# Patient Record
Sex: MALE | Race: WHITE
[De-identification: names, ages, dates, MRNs, and addresses within clinical notes are randomized per-mention and may not be internally consistent; named-entity substitution may affect disease eponyms.]

---

## 2019-08-15 ENCOUNTER — HOSPITAL ENCOUNTER (EMERGENCY)
Dept: HOSPITAL 92 - SCSER | Age: 84
Discharge: HOME | End: 2019-08-15
Payer: MEDICARE

## 2019-08-15 DIAGNOSIS — F32.9: ICD-10-CM

## 2019-08-15 DIAGNOSIS — I49.9: ICD-10-CM

## 2019-08-15 DIAGNOSIS — I48.91: ICD-10-CM

## 2019-08-15 DIAGNOSIS — M25.551: Primary | ICD-10-CM

## 2019-08-15 DIAGNOSIS — K21.9: ICD-10-CM

## 2019-08-15 PROCEDURE — 72192 CT PELVIS W/O DYE: CPT

## 2019-08-15 NOTE — RAD
TWO VIEWS RIGHT HIP:

8/15/19

 

HISTORY: 

Posterior right hip pain with onset of symptoms this morning. Patient pivoted and heard a popping peter
nd. 

 

COMPARISON: 

None.

 

FINDINGS: 

There is no evidence of a  fracture or dislocation involving the right hip. 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
esent involving the right hip. Vascular calcifications and phleboliths overlie the pelvis with vascul
ar calcifications in the region of the femoral arteries. Degenerative changes seen involving the lowe
r lumbar spine. 

 

IMPRESSION: 

No acute osseous abnormality right hip. 

 

POS: C

## 2019-08-15 NOTE — CT
CT OF PELVIS PERFORMED WITHOUT CONTRAST ENHANCEMENT:

8/15/19

 

HISTORY: 

Right hip pain after fall. 

 

The prostate is enlarged. There is some mild bladder wall thickening. No significant pelvic lymphaden
opathy. 

 

The bones appear demineralized. There are marked arthritic changes in the lower lumbar spine. Severe 
disc narrowing at L4-5 and L5-S1. There is approximately 10 mm of spondylolisthesis of L4 on L5 and a
pproximately 8 to 9 mm of L5 on S1. 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 There is an 
old injury related to the ischial tuberosity on the left. There are arthritic changes of both hips. T
here is no signs of any right hip fracture. 

 

IMPRESSION: 

No evidence of acute fracture. Other findings as noted above. 

 

POS: ROSIE

## 2019-11-06 ENCOUNTER — HOSPITAL ENCOUNTER (OUTPATIENT)
Dept: HOSPITAL 92 - RAD | Age: 84
Discharge: HOME | End: 2019-11-06
Attending: INTERNAL MEDICINE
Payer: MEDICARE

## 2019-11-06 DIAGNOSIS — R06.00: Primary | ICD-10-CM

## 2019-11-06 PROCEDURE — 71046 X-RAY EXAM CHEST 2 VIEWS: CPT

## 2019-11-06 NOTE — RAD
Exam: Chest 2 views



HISTORY:Dyspnea



Comparison: 3/1/2016



FINDINGS:



Lungs: Interstitial prominence, bilaterally.

Cardiac silhouette:Stable

Left-sided cardiac pacing device.

Pulmonary vessels: Normal

Pleural Spaces: Clear

Pneumothorax: None



Osseous abnormalities: None of acuity.



IMPRESSION:

 No focal consolidation.

Stable chest.





Reported By: Kenney Schroeder 

Electronically Signed:  11/6/2019 8:55 AM

## 2022-09-16 ENCOUNTER — HOSPITAL ENCOUNTER (OUTPATIENT)
Dept: HOSPITAL 92 - LABBT | Age: 87
Discharge: HOME | End: 2022-09-16
Attending: ORTHOPAEDIC SURGERY
Payer: MEDICARE

## 2022-09-16 DIAGNOSIS — M17.11: ICD-10-CM

## 2022-09-16 DIAGNOSIS — Z20.822: ICD-10-CM

## 2022-09-16 DIAGNOSIS — Z01.818: Primary | ICD-10-CM

## 2022-09-16 LAB
ANION GAP SERPL CALC-SCNC: 15 MMOL/L (ref 10–20)
BASOPHILS # BLD AUTO: 0.1 10X3/UL (ref 0–0.2)
BASOPHILS NFR BLD AUTO: 1 % (ref 0–2)
BUN SERPL-MCNC: 16 MG/DL (ref 8.4–25.7)
CALCIUM SERPL-MCNC: 9 MG/DL (ref 7.8–10.44)
CHLORIDE SERPL-SCNC: 107 MMOL/L (ref 98–107)
CO2 SERPL-SCNC: 25 MMOL/L (ref 23–31)
CREAT CL PREDICTED SERPL C-G-VRATE: 0 ML/MIN (ref 70–130)
EOSINOPHIL # BLD AUTO: 0.2 10X3/UL (ref 0–0.5)
EOSINOPHIL NFR BLD AUTO: 4.3 % (ref 0–6)
GLUCOSE SERPL-MCNC: 85 MG/DL (ref 83–110)
HGB BLD-MCNC: 10.8 G/DL (ref 13.5–17.5)
INR PPP: 1.1
LYMPHOCYTES NFR BLD AUTO: 32.7 % (ref 18–47)
MCH RBC QN AUTO: 30.7 PG (ref 27–33)
MCV RBC AUTO: 96.3 FL (ref 81.2–95.1)
MONOCYTES # BLD AUTO: 0.6 10X3/UL (ref 0–1.1)
MONOCYTES NFR BLD AUTO: 10.8 % (ref 0–10)
NEUTROPHILS # BLD AUTO: 2.6 10X3/UL (ref 1.5–8.4)
NEUTROPHILS NFR BLD AUTO: 51 % (ref 40–75)
PLATELET # BLD AUTO: 195 10X3/UL (ref 150–450)
POTASSIUM SERPL-SCNC: 4.3 MMOL/L (ref 3.5–5.1)
PROTHROMBIN TIME: 12 SEC (ref 9.5–12.1)
RBC # BLD AUTO: 3.52 10X6/UL (ref 4.32–5.72)
SODIUM SERPL-SCNC: 143 MMOL/L (ref 136–145)
WBC # BLD AUTO: 5.1 10X3/UL (ref 3.5–10.5)

## 2022-09-16 PROCEDURE — 87081 CULTURE SCREEN ONLY: CPT

## 2022-09-16 PROCEDURE — 87811 SARS-COV-2 COVID19 W/OPTIC: CPT

## 2022-09-16 PROCEDURE — 93005 ELECTROCARDIOGRAM TRACING: CPT

## 2022-09-16 PROCEDURE — 80048 BASIC METABOLIC PNL TOTAL CA: CPT

## 2022-09-16 PROCEDURE — 85610 PROTHROMBIN TIME: CPT

## 2022-09-16 PROCEDURE — 85025 COMPLETE CBC W/AUTO DIFF WBC: CPT

## 2022-09-16 PROCEDURE — 93010 ELECTROCARDIOGRAM REPORT: CPT

## 2022-09-20 ENCOUNTER — HOSPITAL ENCOUNTER (INPATIENT)
Dept: HOSPITAL 92 - SDC | Age: 87
LOS: 8 days | Discharge: HOME HEALTH | DRG: 470 | End: 2022-09-28
Attending: ORTHOPAEDIC SURGERY | Admitting: ORTHOPAEDIC SURGERY
Payer: MEDICARE

## 2022-09-20 VITALS — BODY MASS INDEX: 24.4 KG/M2

## 2022-09-20 DIAGNOSIS — Z80.9: ICD-10-CM

## 2022-09-20 DIAGNOSIS — N40.0: ICD-10-CM

## 2022-09-20 DIAGNOSIS — I10: ICD-10-CM

## 2022-09-20 DIAGNOSIS — E11.9: ICD-10-CM

## 2022-09-20 DIAGNOSIS — F03.90: ICD-10-CM

## 2022-09-20 DIAGNOSIS — M17.11: Primary | ICD-10-CM

## 2022-09-20 DIAGNOSIS — Z79.01: ICD-10-CM

## 2022-09-20 DIAGNOSIS — D64.9: ICD-10-CM

## 2022-09-20 DIAGNOSIS — E78.5: ICD-10-CM

## 2022-09-20 DIAGNOSIS — G47.30: ICD-10-CM

## 2022-09-20 DIAGNOSIS — I48.0: ICD-10-CM

## 2022-09-20 DIAGNOSIS — Z88.6: ICD-10-CM

## 2022-09-20 DIAGNOSIS — F32.A: ICD-10-CM

## 2022-09-20 DIAGNOSIS — Z95.0: ICD-10-CM

## 2022-09-20 DIAGNOSIS — Z86.73: ICD-10-CM

## 2022-09-20 PROCEDURE — C1776 JOINT DEVICE (IMPLANTABLE): HCPCS

## 2022-09-20 PROCEDURE — 0SRC0J9 REPLACEMENT OF RIGHT KNEE JOINT WITH SYNTHETIC SUBSTITUTE, CEMENTED, OPEN APPROACH: ICD-10-PCS | Performed by: ORTHOPAEDIC SURGERY

## 2022-09-20 PROCEDURE — 36416 COLLJ CAPILLARY BLOOD SPEC: CPT

## 2022-09-20 PROCEDURE — A4306 DRUG DELIVERY SYSTEM <=50 ML: HCPCS

## 2022-09-20 PROCEDURE — 70450 CT HEAD/BRAIN W/O DYE: CPT

## 2022-09-20 PROCEDURE — 85027 COMPLETE CBC AUTOMATED: CPT

## 2022-09-20 PROCEDURE — C1713 ANCHOR/SCREW BN/BN,TIS/BN: HCPCS

## 2022-09-20 PROCEDURE — 36415 COLL VENOUS BLD VENIPUNCTURE: CPT

## 2022-09-20 PROCEDURE — S0020 INJECTION, BUPIVICAINE HYDRO: HCPCS

## 2022-09-20 PROCEDURE — 80048 BASIC METABOLIC PNL TOTAL CA: CPT

## 2022-09-20 RX ADMIN — DOCUSATE SODIUM 50 MG AND SENNOSIDES 8.6 MG SCH TAB: 8.6; 5 TABLET, FILM COATED ORAL at 21:21

## 2022-09-20 RX ADMIN — DOCUSATE SODIUM 50 MG AND SENNOSIDES 8.6 MG SCH: 8.6; 5 TABLET, FILM COATED ORAL at 12:12

## 2022-09-20 RX ADMIN — MULTIPLE VITAMINS W/ MINERALS TAB SCH: TAB at 09:28

## 2022-09-20 RX ADMIN — ASPIRIN SCH: 81 TABLET ORAL at 09:26

## 2022-09-20 RX ADMIN — NYSTATIN SCH: 100000 POWDER TOPICAL at 21:30

## 2022-09-20 RX ADMIN — ASPIRIN SCH MG: 81 TABLET ORAL at 21:19

## 2022-09-20 RX ADMIN — NYSTATIN SCH APPLIC: 100000 POWDER TOPICAL at 16:12

## 2022-09-21 LAB
HGB BLD-MCNC: 8.9 G/DL (ref 14–18)
MCH RBC QN AUTO: 31.8 PG (ref 27–31)
MCV RBC AUTO: 101 FL (ref 78–98)
PLATELET # BLD AUTO: 127 THOU/UL (ref 130–400)
RBC # BLD AUTO: 2.8 MILL/UL (ref 4.7–6.1)
WBC # BLD AUTO: 5.8 THOU/UL (ref 4.8–10.8)

## 2022-09-21 RX ADMIN — DOCUSATE SODIUM 50 MG AND SENNOSIDES 8.6 MG SCH TAB: 8.6; 5 TABLET, FILM COATED ORAL at 22:36

## 2022-09-21 RX ADMIN — DOCUSATE SODIUM 50 MG AND SENNOSIDES 8.6 MG SCH: 8.6; 5 TABLET, FILM COATED ORAL at 15:28

## 2022-09-21 RX ADMIN — NYSTATIN SCH APPLIC: 100000 POWDER TOPICAL at 22:38

## 2022-09-21 RX ADMIN — ASPIRIN SCH MG: 81 TABLET ORAL at 10:19

## 2022-09-21 RX ADMIN — MULTIPLE VITAMINS W/ MINERALS TAB SCH TAB: TAB at 15:27

## 2022-09-21 RX ADMIN — ASPIRIN SCH MG: 81 TABLET ORAL at 22:36

## 2022-09-21 RX ADMIN — NYSTATIN SCH: 100000 POWDER TOPICAL at 10:19

## 2022-09-22 LAB
HGB BLD-MCNC: 8.5 G/DL (ref 14–18)
MCH RBC QN AUTO: 31.9 PG (ref 27–31)
MCV RBC AUTO: 103 FL (ref 78–98)
PLATELET # BLD AUTO: 111 THOU/UL (ref 130–400)
RBC # BLD AUTO: 2.65 MILL/UL (ref 4.7–6.1)
WBC # BLD AUTO: 5.5 THOU/UL (ref 4.8–10.8)

## 2022-09-22 RX ADMIN — ASPIRIN SCH MG: 81 TABLET ORAL at 20:21

## 2022-09-22 RX ADMIN — NYSTATIN SCH APPLIC: 100000 POWDER TOPICAL at 20:29

## 2022-09-22 RX ADMIN — DOCUSATE SODIUM 50 MG AND SENNOSIDES 8.6 MG SCH TAB: 8.6; 5 TABLET, FILM COATED ORAL at 20:20

## 2022-09-22 RX ADMIN — MULTIPLE VITAMINS W/ MINERALS TAB SCH TAB: TAB at 08:40

## 2022-09-22 RX ADMIN — ASPIRIN SCH MG: 81 TABLET ORAL at 08:40

## 2022-09-22 RX ADMIN — DOCUSATE SODIUM 50 MG AND SENNOSIDES 8.6 MG SCH TAB: 8.6; 5 TABLET, FILM COATED ORAL at 08:40

## 2022-09-22 RX ADMIN — NYSTATIN SCH APPLIC: 100000 POWDER TOPICAL at 08:41

## 2022-09-23 LAB
ANION GAP SERPL CALC-SCNC: 12 MMOL/L (ref 10–20)
BUN SERPL-MCNC: 38 MG/DL (ref 8.4–25.7)
CALCIUM SERPL-MCNC: 8.4 MG/DL (ref 7.8–10.44)
CHLORIDE SERPL-SCNC: 111 MMOL/L (ref 98–107)
CO2 SERPL-SCNC: 20 MMOL/L (ref 23–31)
CREAT CL PREDICTED SERPL C-G-VRATE: 50 ML/MIN (ref 70–130)
GLUCOSE SERPL-MCNC: 98 MG/DL (ref 83–110)
HGB BLD-MCNC: 8.8 G/DL (ref 14–18)
MCH RBC QN AUTO: 32.3 PG (ref 27–31)
MCV RBC AUTO: 102 FL (ref 78–98)
PLATELET # BLD AUTO: 127 THOU/UL (ref 130–400)
POTASSIUM SERPL-SCNC: 4 MMOL/L (ref 3.5–5.1)
RBC # BLD AUTO: 2.73 MILL/UL (ref 4.7–6.1)
SODIUM SERPL-SCNC: 139 MMOL/L (ref 136–145)
WBC # BLD AUTO: 5.4 THOU/UL (ref 4.8–10.8)

## 2022-09-23 RX ADMIN — DOCUSATE SODIUM 50 MG AND SENNOSIDES 8.6 MG SCH TAB: 8.6; 5 TABLET, FILM COATED ORAL at 20:45

## 2022-09-23 RX ADMIN — DOCUSATE SODIUM 50 MG AND SENNOSIDES 8.6 MG SCH TAB: 8.6; 5 TABLET, FILM COATED ORAL at 09:33

## 2022-09-23 RX ADMIN — ASPIRIN SCH MG: 81 TABLET ORAL at 20:44

## 2022-09-23 RX ADMIN — MULTIPLE VITAMINS W/ MINERALS TAB SCH TAB: TAB at 09:33

## 2022-09-23 RX ADMIN — NYSTATIN SCH APPLIC: 100000 POWDER TOPICAL at 21:14

## 2022-09-23 RX ADMIN — NYSTATIN SCH APPLIC: 100000 POWDER TOPICAL at 09:33

## 2022-09-23 RX ADMIN — ASPIRIN SCH MG: 81 TABLET ORAL at 09:33

## 2022-09-24 LAB
HGB BLD-MCNC: 9.5 G/DL (ref 14–18)
MCH RBC QN AUTO: 32.6 PG (ref 27–31)
MCV RBC AUTO: 102 FL (ref 78–98)
PLATELET # BLD AUTO: 141 THOU/UL (ref 130–400)
RBC # BLD AUTO: 2.92 MILL/UL (ref 4.7–6.1)
WBC # BLD AUTO: 4.8 THOU/UL (ref 4.8–10.8)

## 2022-09-24 RX ADMIN — NYSTATIN SCH APPLIC: 100000 POWDER TOPICAL at 09:13

## 2022-09-24 RX ADMIN — DOCUSATE SODIUM 50 MG AND SENNOSIDES 8.6 MG SCH TAB: 8.6; 5 TABLET, FILM COATED ORAL at 09:13

## 2022-09-24 RX ADMIN — DOCUSATE SODIUM 50 MG AND SENNOSIDES 8.6 MG SCH TAB: 8.6; 5 TABLET, FILM COATED ORAL at 20:20

## 2022-09-24 RX ADMIN — MULTIPLE VITAMINS W/ MINERALS TAB SCH TAB: TAB at 09:12

## 2022-09-24 RX ADMIN — ASPIRIN SCH MG: 81 TABLET ORAL at 09:13

## 2022-09-24 RX ADMIN — NYSTATIN SCH APPLIC: 100000 POWDER TOPICAL at 20:21

## 2022-09-24 RX ADMIN — ASPIRIN SCH MG: 81 TABLET ORAL at 20:20

## 2022-09-25 LAB
HGB BLD-MCNC: 9 G/DL (ref 14–18)
MCH RBC QN AUTO: 32.6 PG (ref 27–31)
MCV RBC AUTO: 99.9 FL (ref 78–98)
PLATELET # BLD AUTO: 140 THOU/UL (ref 130–400)
RBC # BLD AUTO: 2.76 MILL/UL (ref 4.7–6.1)
WBC # BLD AUTO: 4.7 THOU/UL (ref 4.8–10.8)

## 2022-09-25 RX ADMIN — DOCUSATE SODIUM 50 MG AND SENNOSIDES 8.6 MG SCH: 8.6; 5 TABLET, FILM COATED ORAL at 20:53

## 2022-09-25 RX ADMIN — ASPIRIN SCH MG: 81 TABLET ORAL at 08:53

## 2022-09-25 RX ADMIN — NYSTATIN SCH APPLIC: 100000 POWDER TOPICAL at 21:21

## 2022-09-25 RX ADMIN — ASPIRIN SCH MG: 81 TABLET ORAL at 20:52

## 2022-09-25 RX ADMIN — DOCUSATE SODIUM 50 MG AND SENNOSIDES 8.6 MG SCH TAB: 8.6; 5 TABLET, FILM COATED ORAL at 08:53

## 2022-09-25 RX ADMIN — NYSTATIN SCH APPLIC: 100000 POWDER TOPICAL at 08:56

## 2022-09-25 RX ADMIN — MULTIPLE VITAMINS W/ MINERALS TAB SCH TAB: TAB at 08:53

## 2022-09-26 LAB
HGB BLD-MCNC: 9.2 G/DL (ref 14–18)
MCH RBC QN AUTO: 32.9 PG (ref 27–31)
MCV RBC AUTO: 100 FL (ref 78–98)
PLATELET # BLD AUTO: 147 THOU/UL (ref 130–400)
RBC # BLD AUTO: 2.8 MILL/UL (ref 4.7–6.1)
WBC # BLD AUTO: 5 THOU/UL (ref 4.8–10.8)

## 2022-09-26 RX ADMIN — ASPIRIN SCH MG: 81 TABLET ORAL at 20:15

## 2022-09-26 RX ADMIN — DOCUSATE SODIUM 50 MG AND SENNOSIDES 8.6 MG SCH: 8.6; 5 TABLET, FILM COATED ORAL at 09:04

## 2022-09-26 RX ADMIN — MULTIPLE VITAMINS W/ MINERALS TAB SCH TAB: TAB at 09:00

## 2022-09-26 RX ADMIN — NYSTATIN SCH APPLIC: 100000 POWDER TOPICAL at 20:16

## 2022-09-26 RX ADMIN — NYSTATIN SCH APPLIC: 100000 POWDER TOPICAL at 09:03

## 2022-09-26 RX ADMIN — DOCUSATE SODIUM 50 MG AND SENNOSIDES 8.6 MG SCH: 8.6; 5 TABLET, FILM COATED ORAL at 20:16

## 2022-09-26 RX ADMIN — ASPIRIN SCH MG: 81 TABLET ORAL at 08:58

## 2022-09-27 RX ADMIN — ASPIRIN SCH MG: 81 TABLET ORAL at 20:04

## 2022-09-27 RX ADMIN — DOCUSATE SODIUM 50 MG AND SENNOSIDES 8.6 MG SCH: 8.6; 5 TABLET, FILM COATED ORAL at 20:05

## 2022-09-27 RX ADMIN — ASPIRIN SCH MG: 81 TABLET ORAL at 08:20

## 2022-09-27 RX ADMIN — MULTIPLE VITAMINS W/ MINERALS TAB SCH TAB: TAB at 08:20

## 2022-09-27 RX ADMIN — NYSTATIN SCH APPLIC: 100000 POWDER TOPICAL at 20:05

## 2022-09-27 RX ADMIN — NYSTATIN SCH APPLIC: 100000 POWDER TOPICAL at 08:25

## 2022-09-27 RX ADMIN — DOCUSATE SODIUM 50 MG AND SENNOSIDES 8.6 MG SCH TAB: 8.6; 5 TABLET, FILM COATED ORAL at 08:20

## 2022-09-28 VITALS — TEMPERATURE: 98 F | DIASTOLIC BLOOD PRESSURE: 70 MMHG | SYSTOLIC BLOOD PRESSURE: 134 MMHG

## 2022-09-28 RX ADMIN — NYSTATIN SCH APPLIC: 100000 POWDER TOPICAL at 09:03

## 2022-09-28 RX ADMIN — ASPIRIN SCH MG: 81 TABLET ORAL at 09:01

## 2022-09-28 RX ADMIN — DOCUSATE SODIUM 50 MG AND SENNOSIDES 8.6 MG SCH TAB: 8.6; 5 TABLET, FILM COATED ORAL at 09:01

## 2022-09-28 RX ADMIN — MULTIPLE VITAMINS W/ MINERALS TAB SCH TAB: TAB at 09:02

## 2022-10-24 ENCOUNTER — HOSPITAL ENCOUNTER (OUTPATIENT)
Dept: HOSPITAL 92 - CSHCT | Age: 87
Discharge: HOME | End: 2022-10-24
Attending: NEUROLOGICAL SURGERY
Payer: MEDICARE

## 2022-10-24 DIAGNOSIS — S06.5XAA: Primary | ICD-10-CM

## 2022-10-24 PROCEDURE — 70450 CT HEAD/BRAIN W/O DYE: CPT

## 2023-02-24 ENCOUNTER — HOSPITAL ENCOUNTER (OUTPATIENT)
Dept: HOSPITAL 92 - CT | Age: 88
Discharge: HOME | End: 2023-02-24
Attending: PHYSICIAN ASSISTANT
Payer: MEDICARE

## 2023-02-24 DIAGNOSIS — S06.5X0D: Primary | ICD-10-CM

## 2023-02-24 PROCEDURE — 70450 CT HEAD/BRAIN W/O DYE: CPT
